# Patient Record
Sex: FEMALE | Race: WHITE | NOT HISPANIC OR LATINO | URBAN - METROPOLITAN AREA
[De-identification: names, ages, dates, MRNs, and addresses within clinical notes are randomized per-mention and may not be internally consistent; named-entity substitution may affect disease eponyms.]

---

## 2018-07-30 ENCOUNTER — OFFICE VISIT CONVERTED (OUTPATIENT)
Dept: GASTROENTEROLOGY | Facility: CLINIC | Age: 73
End: 2018-07-30
Attending: NURSE PRACTITIONER

## 2021-05-16 VITALS
DIASTOLIC BLOOD PRESSURE: 72 MMHG | SYSTOLIC BLOOD PRESSURE: 151 MMHG | TEMPERATURE: 98.8 F | HEART RATE: 93 BPM | WEIGHT: 155 LBS

## 2023-11-16 ENCOUNTER — OFFICE VISIT (OUTPATIENT)
Dept: ORTHOPEDIC SURGERY | Facility: CLINIC | Age: 78
End: 2023-11-16
Payer: MEDICARE

## 2023-11-16 VITALS
DIASTOLIC BLOOD PRESSURE: 89 MMHG | BODY MASS INDEX: 25.83 KG/M2 | SYSTOLIC BLOOD PRESSURE: 139 MMHG | OXYGEN SATURATION: 96 % | WEIGHT: 155 LBS | HEART RATE: 91 BPM | HEIGHT: 65 IN

## 2023-11-16 DIAGNOSIS — M25.562 LEFT KNEE PAIN, UNSPECIFIED CHRONICITY: Primary | ICD-10-CM

## 2023-11-16 DIAGNOSIS — M17.12 OSTEOARTHRITIS OF LEFT KNEE, UNSPECIFIED OSTEOARTHRITIS TYPE: ICD-10-CM

## 2023-11-16 DIAGNOSIS — S89.92XA INJURY OF LEFT KNEE, INITIAL ENCOUNTER: ICD-10-CM

## 2023-11-16 RX ORDER — DIPHENOXYLATE HYDROCHLORIDE AND ATROPINE SULFATE 2.5; .025 MG/1; MG/1
1 TABLET ORAL DAILY
COMMUNITY

## 2023-11-16 RX ORDER — MELOXICAM 15 MG/1
1 TABLET ORAL DAILY
COMMUNITY

## 2023-11-16 RX ORDER — METOPROLOL SUCCINATE 25 MG/1
25 TABLET, EXTENDED RELEASE ORAL DAILY
COMMUNITY
Start: 2023-02-14 | End: 2024-02-14

## 2023-11-16 NOTE — PROGRESS NOTES
"Chief Complaint  Initial Evaluation of the Left Knee     Subjective      Serenity Pringle presents to [facility] for initial evaluation of the left knee. She has parkinson's and has pain in the left knee. She last week ago Tuesday took a step and heard a pop.  She has one step into the house and has had decrease ambulation since the.  A month ago her left knee was fine.  She notes it is getting better and she is still having some pain. She takes Meloxicam.     No Known Allergies     Social History     Socioeconomic History    Marital status:    Tobacco Use    Smoking status: Never    Smokeless tobacco: Never   Vaping Use    Vaping Use: Never used        I reviewed the patient's chief complaint, history of present illness, review of systems, past medical history, surgical history, family history, social history, medications, and allergy list.     Review of Systems     Constitutional: Denies fevers, chills, weight loss  Cardiovascular: Denies chest pain, shortness of breath  Skin: Denies rashes, acute skin changes  Neurologic: Denies headache, loss of consciousness        Vital Signs:   /89   Pulse 91   Ht 165.1 cm (65\")   Wt 70.3 kg (155 lb)   SpO2 96%   BMI 25.79 kg/m²          Physical Exam  General: Alert. No acute distress    Ortho Exam        LEFT KNEE Flexion 110. Extension -3. Stable to varus/valgus stress. Stable to anterior/posterior drawer. Neurovascularly intact. Negative Carlos. Negative Lachman. Positive EHL, FHL, HS and TA. Sensation intact to light touch all 5 nerves of the foot. Ambulates with Antalgic gait. Patella is well tracking. Calf supple, non-tender. Positive tenderness to the medial joint line. Positive tenderness to the lateral joint line. Positive Crepitus. Good strength to hamstrings, quadriceps, dorsiflexors, and plantar flexors.  Knee Extensor Mechanism intact       Procedures      Imaging Results (Most Recent)       Procedure Component " Value Units Date/Time    XR Knee 3 View Left [[id number]] Resulted: 11/16/23 1430     Updated: 11/16/23 1443             Result Review :     X-Ray Report:  Left knee X-Ray  Indication: Evaluation of the left knee  AP/Lateral and [place] view(s)  Findings: Moderate - severe arthritis of the left knee.   Prior studies available for comparison: no             Assessment and Plan     Diagnoses and all orders for this visit:    1. Left knee pain, unspecified chronicity (Primary)  -     XR Knee 3 View Left    2. Injury of left knee, initial encounter    3. Osteoarthritis of left knee, unspecified osteoarthritis type        Discussed the treatment plan with the patient. I reviewed the X-rays that were obtained today with the patient.     Continue anti inflammatory. Prescribed topical cream.     Discussed injection that she does not want to do today.       Call or return if worsening symptoms.    Follow Up     PRN Discuss steroid injection if pain persists.       Patient was given instructions and counseling regarding her condition or for health maintenance advice. Please see specific information pulled into the AVS if appropriate.     Scribed for Rizwan Sidhu MD by Vilma Andrea MA.  11/16/23   14:30 EST    I have personally performed the services described in this document as scribed by the above individual and it is both accurate and complete. Rizwan Sidhu MD 11/16/23

## 2023-11-28 ENCOUNTER — OFFICE VISIT (OUTPATIENT)
Dept: ORTHOPEDIC SURGERY | Facility: CLINIC | Age: 78
End: 2023-11-28
Payer: MEDICARE

## 2023-11-28 VITALS
SYSTOLIC BLOOD PRESSURE: 147 MMHG | DIASTOLIC BLOOD PRESSURE: 88 MMHG | HEART RATE: 68 BPM | BODY MASS INDEX: 25.83 KG/M2 | OXYGEN SATURATION: 96 % | HEIGHT: 65 IN | WEIGHT: 155 LBS

## 2023-11-28 DIAGNOSIS — M17.12 OSTEOARTHRITIS OF LEFT KNEE, UNSPECIFIED OSTEOARTHRITIS TYPE: ICD-10-CM

## 2023-11-28 DIAGNOSIS — M25.562 LEFT KNEE PAIN, UNSPECIFIED CHRONICITY: Primary | ICD-10-CM

## 2023-11-28 RX ORDER — LIDOCAINE HYDROCHLORIDE 10 MG/ML
5 INJECTION, SOLUTION INFILTRATION; PERINEURAL
Status: COMPLETED | OUTPATIENT
Start: 2023-11-28 | End: 2023-11-28

## 2023-11-28 RX ORDER — TRIAMCINOLONE ACETONIDE 40 MG/ML
40 INJECTION, SUSPENSION INTRA-ARTICULAR; INTRAMUSCULAR
Status: COMPLETED | OUTPATIENT
Start: 2023-11-28 | End: 2023-11-28

## 2023-11-28 RX ADMIN — LIDOCAINE HYDROCHLORIDE 5 ML: 10 INJECTION, SOLUTION INFILTRATION; PERINEURAL at 14:46

## 2023-11-28 RX ADMIN — TRIAMCINOLONE ACETONIDE 40 MG: 40 INJECTION, SUSPENSION INTRA-ARTICULAR; INTRAMUSCULAR at 14:46

## 2023-11-28 NOTE — PROGRESS NOTES
"Chief Complaint  Follow-up of the Left Knee     Subjective      Serenity Pringle presents to [facility] for follow up of the left knee. She has parkinson's and has pain in the left knee. She the beginning of November took a step and heard a pop.  She has one step into the house and has had decrease ambulation since the.  A month ago her left knee was fine.  She notes it is getting better and she is still having some pain. She was in office on 11/16/23 and prescribed anti inflammatory and discussed injections.  She notes the knee is worse then it was. She is having a hard time sleeping.     No Known Allergies     Social History     Socioeconomic History    Marital status:    Tobacco Use    Smoking status: Never    Smokeless tobacco: Never   Vaping Use    Vaping Use: Never used        I reviewed the patient's chief complaint, history of present illness, review of systems, past medical history, surgical history, family history, social history, medications, and allergy list.     Review of Systems     Constitutional: Denies fevers, chills, weight loss  Cardiovascular: Denies chest pain, shortness of breath  Skin: Denies rashes, acute skin changes  Neurologic: Denies headache, loss of consciousness        Vital Signs:   /88 (BP Location: Left arm, Patient Position: Sitting, Cuff Size: Adult)   Pulse 68   Ht 165.1 cm (65\")   Wt 70.3 kg (155 lb)   SpO2 96%   BMI 25.79 kg/m²          Physical Exam  General: Alert. No acute distress    Ortho Exam        LEFT KNEE Flexion 110. Extension -3. Stable to varus/valgus stress. Stable to anterior/posterior drawer. Neurovascularly intact. Negative Carlos. Negative Lachman. Positive EHL, FHL, HS and TA. Sensation intact to light touch all 5 nerves of the foot. Ambulates with Antalgic gait. Patella is well tracking. Calf supple, non-tender. Positive tenderness to the medial joint line. Positive tenderness to the lateral joint line. " Positive Crepitus. Good strength to hamstrings, quadriceps, dorsiflexors, and plantar flexors.  Knee Extensor Mechanism intact Moderate swelling.       Large Joint Arthrocentesis: L knee  Date/Time: 11/28/2023 2:46 PM  Consent given by: patient  Site marked: site marked  Timeout: Immediately prior to procedure a time out was called to verify the correct patient, procedure, equipment, support staff and site/side marked as required   Supporting Documentation  Indications: pain   Procedure Details  Location: knee - L knee  Needle gauge: 21G.  Medications administered: 5 mL lidocaine 1 %; 40 mg triamcinolone acetonide 40 MG/ML  Patient tolerance: patient tolerated the procedure well with no immediate complications        Imaging Results (Most Recent)       None             Result Review :         XR Knee 3 View Left    Result Date: 11/16/2023  Narrative: X-Ray Report: Left knee X-Ray Indication: Evaluation of the left knee AP/Lateral and [place] view(s) Findings: Moderate - severe arthritis of the left knee. Prior studies available for comparison: no             Assessment and Plan     Diagnoses and all orders for this visit:    1. Left knee pain, unspecified chronicity (Primary)    2. Osteoarthritis of left knee, unspecified osteoarthritis type        Discussed the treatment plan with the patient.     Discussed the risks and benefits of conservative measures. The patient expressed understanding and wished to proceed with a left knee steroid injection.  She tolerated the injection well.     Call or return if worsening symptoms.    Follow Up     4-6 weeks discuss if injection was helpful.       Patient was given instructions and counseling regarding her condition or for health maintenance advice. Please see specific information pulled into the AVS if appropriate.     Scribed for Rizwan Sidhu MD by Vilma Andrea MA.  11/28/23   14:33 EST    I have personally performed the services described in this document as  scribed by the above individual and it is both accurate and complete. Rizwan Sidhu MD 11/28/23

## 2023-12-07 ENCOUNTER — OFFICE VISIT (OUTPATIENT)
Dept: ORTHOPEDIC SURGERY | Facility: CLINIC | Age: 78
End: 2023-12-07
Payer: MEDICARE

## 2023-12-07 VITALS
HEIGHT: 65 IN | HEART RATE: 88 BPM | SYSTOLIC BLOOD PRESSURE: 156 MMHG | BODY MASS INDEX: 25.83 KG/M2 | OXYGEN SATURATION: 95 % | DIASTOLIC BLOOD PRESSURE: 90 MMHG | WEIGHT: 155 LBS

## 2023-12-07 DIAGNOSIS — M25.562 LEFT KNEE PAIN, UNSPECIFIED CHRONICITY: Primary | ICD-10-CM

## 2023-12-07 DIAGNOSIS — M17.12 OSTEOARTHRITIS OF LEFT KNEE, UNSPECIFIED OSTEOARTHRITIS TYPE: ICD-10-CM

## 2023-12-07 NOTE — PROGRESS NOTES
"Chief Complaint  Pain and Follow-up of the Left Knee     Subjective      Serenity Pringle presents to [facility] for follow up of the left knee.  She has parkinson's and has pain in the left knee. She the beginning of November took a step and heard a pop.  She has one step into the house and has had decrease ambulation since the.  A month ago her left knee was fine.  She notes it is getting better and she is still having some pain. She was in office on 11/16/23 and prescribed anti inflammatory and discussed injections.  She notes the knee is worse then it was. She is having a hard time sleeping. She was in office on 11/28/23 had a steroid injection.  She is here today with continued pain in the left knee.     Allergies   Allergen Reactions    Prednisone Unknown - Low Severity        Social History     Socioeconomic History    Marital status:    Tobacco Use    Smoking status: Never    Smokeless tobacco: Never   Vaping Use    Vaping Use: Never used        I reviewed the patient's chief complaint, history of present illness, review of systems, past medical history, surgical history, family history, social history, medications, and allergy list.     Review of Systems     Constitutional: Denies fevers, chills, weight loss  Cardiovascular: Denies chest pain, shortness of breath  Skin: Denies rashes, acute skin changes  Neurologic: Denies headache, loss of consciousness        Vital Signs:   /90   Pulse 88   Ht 165.1 cm (65\")   Wt 70.3 kg (155 lb)   SpO2 95%   BMI 25.79 kg/m²          Physical Exam  General: Alert. No acute distress    Ortho Exam           LEFT KNEE Flexion 110. Extension -3. Stable to varus/valgus stress. Stable to anterior/posterior drawer. Neurovascularly intact. Mildly positive.  Carlos. Negative Lachman. Positive EHL, FHL, HS and TA. Sensation intact to light touch all 5 nerves of the foot. Ambulates with Antalgic gait. Patella is well tracking. Calf " supple, non-tender. Positive tenderness to the medial joint line. Positive tenderness to the lateral joint line. Positive Crepitus. Good strength to hamstrings, quadriceps, dorsiflexors, and plantar flexors.  Knee Extensor Mechanism intact Moderate swelling.          Procedures      Imaging Results (Most Recent)       None             Result Review :         XR Knee 3 View Left    Result Date: 11/16/2023  Narrative: X-Ray Report: Left knee X-Ray Indication: Evaluation of the left knee AP/Lateral and [place] view(s) Findings: Moderate - severe arthritis of the left knee. Prior studies available for comparison: no             Assessment and Plan     Diagnoses and all orders for this visit:    1. Left knee pain, unspecified chronicity (Primary)    2. Osteoarthritis of left knee, unspecified osteoarthritis type        Discussed the treatment plan with the patient.     MRI of the left knee to assess the structure.     Call or return if worsening symptoms.    Follow Up     After MRI of the left knee.       Patient was given instructions and counseling regarding her condition or for health maintenance advice. Please see specific information pulled into the AVS if appropriate.     Scribed for Rizwan Sidhu MD by Vilma Andrea MA.  12/07/23   15:05 EST    I have personally performed the services described in this document as scribed by the above individual and it is both accurate and complete. Rizwan Sidhu MD 12/07/23

## 2023-12-18 ENCOUNTER — HOSPITAL ENCOUNTER (OUTPATIENT)
Dept: MRI IMAGING | Facility: HOSPITAL | Age: 78
Discharge: HOME | End: 2023-12-18
Admitting: ORTHOPAEDIC SURGERY
Payer: MEDICARE

## 2023-12-18 DIAGNOSIS — M17.12 OSTEOARTHRITIS OF LEFT KNEE, UNSPECIFIED OSTEOARTHRITIS TYPE: ICD-10-CM

## 2023-12-18 DIAGNOSIS — M25.562 LEFT KNEE PAIN, UNSPECIFIED CHRONICITY: ICD-10-CM

## 2023-12-18 PROCEDURE — 73721 MRI JNT OF LWR EXTRE W/O DYE: CPT

## 2024-01-18 ENCOUNTER — OFFICE VISIT (OUTPATIENT)
Dept: ORTHOPEDIC SURGERY | Facility: CLINIC | Age: 79
End: 2024-01-18
Payer: MEDICARE

## 2024-01-18 VITALS
SYSTOLIC BLOOD PRESSURE: 161 MMHG | HEART RATE: 92 BPM | OXYGEN SATURATION: 94 % | HEIGHT: 65 IN | BODY MASS INDEX: 25.83 KG/M2 | DIASTOLIC BLOOD PRESSURE: 98 MMHG | WEIGHT: 155 LBS

## 2024-01-18 DIAGNOSIS — S82.142D CLOSED FRACTURE OF LEFT TIBIAL PLATEAU WITH ROUTINE HEALING, SUBSEQUENT ENCOUNTER: ICD-10-CM

## 2024-01-18 DIAGNOSIS — M17.12 OSTEOARTHRITIS OF LEFT KNEE, UNSPECIFIED OSTEOARTHRITIS TYPE: ICD-10-CM

## 2024-01-18 DIAGNOSIS — M25.562 LEFT KNEE PAIN, UNSPECIFIED CHRONICITY: Primary | ICD-10-CM

## 2024-01-18 NOTE — PROGRESS NOTES
"Chief Complaint  Pain and Follow-up of the Left Knee     Subjective      Serenity Pringle presents to [facility] for follow up of the left knee.  She had a MRI on 12/18/23 and is here to review. She had a left knee steroid injection on 11/28/23 that gave no relief.  She heard a pop back in November in her knee.  She notes she is doing better lately.  She uses heat for relief.  She takes Meloxicam. She has Parkinson's.     Allergies   Allergen Reactions    Prednisone Unknown - Low Severity        Social History     Socioeconomic History    Marital status:    Tobacco Use    Smoking status: Never    Smokeless tobacco: Never   Vaping Use    Vaping Use: Never used        I reviewed the patient's chief complaint, history of present illness, review of systems, past medical history, surgical history, family history, social history, medications, and allergy list.     Review of Systems     Constitutional: Denies fevers, chills, weight loss  Cardiovascular: Denies chest pain, shortness of breath  Skin: Denies rashes, acute skin changes  Neurologic: Denies headache, loss of consciousness      Vital Signs:   /98   Pulse 92   Ht 165.1 cm (65\")   Wt 70.3 kg (155 lb)   SpO2 94%   BMI 25.79 kg/m²          Physical Exam  General: Alert. No acute distress    Ortho Exam        LEFT KNEE Flexion 110. Extension 0. Stable to varus/valgus stress. Stable to anterior/posterior drawer. Neurovascularly intact. Mildly positive.  Carlos. Negative Lachman. Positive EHL, FHL, HS and TA. Sensation intact to light touch all 5 nerves of the foot. Ambulates with Antalgic gait. Patella is well tracking. Calf supple, non-tender. Positive tenderness to the medial joint line. Positive tenderness to the lateral joint line. Positive Crepitus. Good strength to hamstrings, quadriceps, dorsiflexors, and plantar flexors.  Knee Extensor Mechanism intact Moderate swelling.          Procedures      Imaging Results " "(Most Recent)       None             Result Review :     MRI 12/18/23    Narrative & Impression   MRI Knee LT WO     INDICATION:    Left knee pain and swelling after taking a step and feeling a \"pop\" 11/7/2023. No prior left knee surgery.     TECHNIQUE:   Routine noncontrast high field left knee MRI.      COMPARISON:   None.     FINDINGS:   Menisci:     Slightly complex vertical radial tear of the posterior body segment medial meniscus. Lateral meniscus appears intact.     Ligaments:      Cruciate ligaments:  Intact.     Medial collateral ligament complex:  Intact.     Lateral collateral ligament complex:  Intact.     Extensor mechanism: Intact.     Fluid: Small joint effusion. 6.8 cm length popliteal cyst.     Articular cartilage:  Patellofemoral compartment:  Full-thickness articular cartilage loss along the medial patellar facet/median ridge with reactive subarticular marrow edema. Low-grade chondromalacia of the lateral patellar facet. Moderate to high-grade chondromalacia of  the medial femoral trochlea. Patellofemoral marginal osteophytes.  Medial compartment:  Moderate to high-grade chondral thinning along the central and posterior weightbearing aspects of the medial femoral condyle with mild reactive subarticular marrow edema. Low-grade chondromalacia of the weightbearing medial tibial  plateau. Nondisplaced subchondral insufficiency fractures of the central and posterior weightbearing medial tibial plateau. Medial compartment marginal osteophytes.     Lateral compartment: Low-grade chondromalacia along the posterior nonweightbearing lateral femoral condyle. Lateral compartment marginal osteophytes.     Osseous structures and musculature:    Remainder of the bone marrow signal is within expected limits. Visualized musculature is within normal limits.     IMPRESSION:     1. Complex vertical radial tear of the posterior body segment medial meniscus.  2. Lateral meniscus, cruciate ligaments, and collateral " ligaments are intact.  3. Tricompartment arthrosis, detailed above, most significant in the patellofemoral joint and medial compartment.  4. Nondisplaced subchondral insufficiency fractures of the central and posterior weightbearing medial tibial plateau.  5. Small joint effusion with a 6.8 cm length popliteal cyst.             Assessment and Plan     Diagnoses and all orders for this visit:    1. Left knee pain, unspecified chronicity (Primary)    2. Osteoarthritis of left knee, unspecified osteoarthritis type    3. Closed fracture of left tibial plateau with routine healing, subsequent encounter        Discussed the treatment plan with the patient. I reviewed the MRI results with the patient.     Modify activity as needed. Prescribed topical cream.       Call or return if worsening symptoms.    Follow Up     PRN      Patient was given instructions and counseling regarding her condition or for health maintenance advice. Please see specific information pulled into the AVS if appropriate.     Scribed for Rizwan Sidhu MD by Vilma Andrea MA.  01/18/24   14:32 EST    I have personally performed the services described in this document as scribed by the above individual and it is both accurate and complete. Rizwan Sidhu MD 01/18/24